# Patient Record
Sex: FEMALE | Race: WHITE | NOT HISPANIC OR LATINO | URBAN - METROPOLITAN AREA
[De-identification: names, ages, dates, MRNs, and addresses within clinical notes are randomized per-mention and may not be internally consistent; named-entity substitution may affect disease eponyms.]

---

## 2017-02-16 ENCOUNTER — OFFICE (OUTPATIENT)
Dept: URBAN - METROPOLITAN AREA CLINIC 14 | Facility: CLINIC | Age: 37
End: 2017-02-16

## 2017-02-16 VITALS
WEIGHT: 159 LBS | SYSTOLIC BLOOD PRESSURE: 120 MMHG | HEART RATE: 104 BPM | HEIGHT: 66 IN | DIASTOLIC BLOOD PRESSURE: 90 MMHG

## 2017-02-16 DIAGNOSIS — R10.13 EPIGASTRIC PAIN: ICD-10-CM

## 2017-02-16 DIAGNOSIS — N39.0 URINARY TRACT INFECTION, SITE NOT SPECIFIED: ICD-10-CM

## 2017-02-16 DIAGNOSIS — K75.81 NONALCOHOLIC STEATOHEPATITIS (NASH): ICD-10-CM

## 2017-02-16 DIAGNOSIS — K92.0 HEMATEMESIS: ICD-10-CM

## 2017-02-16 DIAGNOSIS — E78.5 HYPERLIPIDEMIA, UNSPECIFIED: ICD-10-CM

## 2017-02-16 PROCEDURE — 99214 OFFICE O/P EST MOD 30 MIN: CPT | Performed by: INTERNAL MEDICINE

## 2017-02-16 RX ORDER — DEXLANSOPRAZOLE 60 MG/1
60 CAPSULE, DELAYED RELEASE ORAL
Qty: 30 | Refills: 0 | Status: COMPLETED
Start: 2017-02-16 | End: 2017-04-10

## 2017-02-16 RX ORDER — CIPROFLOXACIN 500 MG/1
TABLET, FILM COATED ORAL
Qty: 14 | Refills: 0 | Status: COMPLETED
Start: 2017-02-16 | End: 2017-04-10

## 2017-02-17 ENCOUNTER — ON CAMPUS - OUTPATIENT (OUTPATIENT)
Dept: URBAN - METROPOLITAN AREA HOSPITAL 117 | Facility: HOSPITAL | Age: 37
End: 2017-02-17

## 2017-02-17 DIAGNOSIS — K29.20 ALCOHOLIC GASTRITIS WITHOUT BLEEDING: ICD-10-CM

## 2017-02-17 DIAGNOSIS — K21.0 GASTRO-ESOPHAGEAL REFLUX DISEASE WITH ESOPHAGITIS: ICD-10-CM

## 2017-02-17 DIAGNOSIS — K22.5 DIVERTICULUM OF ESOPHAGUS, ACQUIRED: ICD-10-CM

## 2017-02-17 DIAGNOSIS — K92.0 HEMATEMESIS: ICD-10-CM

## 2017-02-17 DIAGNOSIS — R10.84 GENERALIZED ABDOMINAL PAIN: ICD-10-CM

## 2017-02-17 DIAGNOSIS — K75.81 NONALCOHOLIC STEATOHEPATITIS (NASH): ICD-10-CM

## 2017-02-17 DIAGNOSIS — K44.9 DIAPHRAGMATIC HERNIA WITHOUT OBSTRUCTION OR GANGRENE: ICD-10-CM

## 2017-02-17 DIAGNOSIS — I85.00 ESOPHAGEAL VARICES WITHOUT BLEEDING: ICD-10-CM

## 2017-02-17 PROCEDURE — 43239 EGD BIOPSY SINGLE/MULTIPLE: CPT | Performed by: INTERNAL MEDICINE

## 2017-02-17 PROCEDURE — 99219: CPT | Mod: 25 | Performed by: INTERNAL MEDICINE

## 2017-04-10 ENCOUNTER — OFFICE (OUTPATIENT)
Dept: URBAN - METROPOLITAN AREA CLINIC 14 | Facility: CLINIC | Age: 37
End: 2017-04-10

## 2017-04-10 VITALS
HEART RATE: 85 BPM | WEIGHT: 151 LBS | SYSTOLIC BLOOD PRESSURE: 108 MMHG | HEIGHT: 66 IN | DIASTOLIC BLOOD PRESSURE: 78 MMHG

## 2017-04-10 DIAGNOSIS — E78.5 HYPERLIPIDEMIA, UNSPECIFIED: ICD-10-CM

## 2017-04-10 DIAGNOSIS — K92.0 HEMATEMESIS: ICD-10-CM

## 2017-04-10 DIAGNOSIS — K75.81 NONALCOHOLIC STEATOHEPATITIS (NASH): ICD-10-CM

## 2017-04-10 PROCEDURE — 99214 OFFICE O/P EST MOD 30 MIN: CPT | Performed by: NURSE PRACTITIONER

## 2017-04-10 NOTE — SERVICEHPINOTES
Ms. Vann presents for a hospital follow-up.  She was hospitalized at [facility] 2/17/17 and had an EGD done by Dr. Morales that revealed gastritis, hiatal hernia and very small esophageal varices.  She denies any further hematemesis and her reflux is controlled with pantoprazole.  She has been seeing Dr. Cordova regarding her dyslipidemia and notes this has significantly improved with a recent total cholesterol of 166, and LDL of 101 and an HDL of 39.  Her recent ALT was 126 with an AST of 190.  She did have a recent  liver biopsy that showed her fibrosis level was a stage 2 and had CHANDA score was 7 out of 8. This was pretty similar to any liver biopsy done a year ago.   She is considering a new clinical trial for RUSSO. She was on [employer] clinical trial which was held due to lack of efficacy and possible worsening of dyslipidemia.  She states that she has not had any alcohol in 7 days.

## 2017-10-31 ENCOUNTER — OFFICE (OUTPATIENT)
Dept: URBAN - METROPOLITAN AREA CLINIC 11 | Facility: CLINIC | Age: 37
End: 2017-10-31

## 2017-10-31 VITALS
SYSTOLIC BLOOD PRESSURE: 128 MMHG | DIASTOLIC BLOOD PRESSURE: 94 MMHG | HEART RATE: 86 BPM | HEIGHT: 66 IN | WEIGHT: 144 LBS

## 2017-10-31 DIAGNOSIS — R10.13 EPIGASTRIC PAIN: ICD-10-CM

## 2017-10-31 DIAGNOSIS — K75.81 NONALCOHOLIC STEATOHEPATITIS (NASH): ICD-10-CM

## 2017-10-31 LAB — ETHANOL, BLOOD: ETHANOL: 0.05 % (ref 0.01–?)

## 2017-10-31 PROCEDURE — 99213 OFFICE O/P EST LOW 20 MIN: CPT | Performed by: INTERNAL MEDICINE

## 2020-08-05 ENCOUNTER — INPATIENT HOSPITAL (OUTPATIENT)
Dept: URBAN - METROPOLITAN AREA HOSPITAL 116 | Facility: HOSPITAL | Age: 40
End: 2020-08-05

## 2020-08-05 DIAGNOSIS — K76.0 FATTY (CHANGE OF) LIVER, NOT ELSEWHERE CLASSIFIED: ICD-10-CM

## 2020-08-05 DIAGNOSIS — U07.1 COVID-19: ICD-10-CM

## 2020-08-05 DIAGNOSIS — N39.0 URINARY TRACT INFECTION, SITE NOT SPECIFIED: ICD-10-CM

## 2020-08-05 DIAGNOSIS — K29.70 GASTRITIS, UNSPECIFIED, WITHOUT BLEEDING: ICD-10-CM

## 2020-08-05 PROCEDURE — 99222 1ST HOSP IP/OBS MODERATE 55: CPT | Performed by: INTERNAL MEDICINE

## 2020-08-06 PROCEDURE — 99232 SBSQ HOSP IP/OBS MODERATE 35: CPT | Performed by: INTERNAL MEDICINE

## 2020-08-07 ENCOUNTER — INPATIENT HOSPITAL (OUTPATIENT)
Dept: URBAN - METROPOLITAN AREA HOSPITAL 116 | Facility: HOSPITAL | Age: 40
End: 2020-08-07

## 2020-08-07 DIAGNOSIS — K29.70 GASTRITIS, UNSPECIFIED, WITHOUT BLEEDING: ICD-10-CM

## 2020-08-07 DIAGNOSIS — K76.0 FATTY (CHANGE OF) LIVER, NOT ELSEWHERE CLASSIFIED: ICD-10-CM

## 2020-08-07 DIAGNOSIS — U07.1 COVID-19: ICD-10-CM

## 2020-08-07 DIAGNOSIS — N39.0 URINARY TRACT INFECTION, SITE NOT SPECIFIED: ICD-10-CM

## 2020-08-07 PROCEDURE — 99232 SBSQ HOSP IP/OBS MODERATE 35: CPT | Performed by: INTERNAL MEDICINE

## 2024-08-07 ENCOUNTER — OFFICE (OUTPATIENT)
Dept: URBAN - METROPOLITAN AREA CLINIC 19 | Facility: CLINIC | Age: 44
End: 2024-08-07
Payer: COMMERCIAL

## 2024-08-07 VITALS
HEIGHT: 66 IN | SYSTOLIC BLOOD PRESSURE: 140 MMHG | WEIGHT: 147 LBS | OXYGEN SATURATION: 100 % | DIASTOLIC BLOOD PRESSURE: 101 MMHG | HEART RATE: 108 BPM

## 2024-08-07 DIAGNOSIS — E55.9 VITAMIN D DEFICIENCY, UNSPECIFIED: ICD-10-CM

## 2024-08-07 DIAGNOSIS — D50.9 IRON DEFICIENCY ANEMIA, UNSPECIFIED: ICD-10-CM

## 2024-08-07 DIAGNOSIS — R68.81 EARLY SATIETY: ICD-10-CM

## 2024-08-07 DIAGNOSIS — R74.01 ELEVATION OF LEVELS OF LIVER TRANSAMINASE LEVELS: ICD-10-CM

## 2024-08-07 DIAGNOSIS — R16.0 HEPATOMEGALY, NOT ELSEWHERE CLASSIFIED: ICD-10-CM

## 2024-08-07 DIAGNOSIS — R74.8 ABNORMAL LEVELS OF OTHER SERUM ENZYMES: ICD-10-CM

## 2024-08-07 PROCEDURE — 99204 OFFICE O/P NEW MOD 45 MIN: CPT | Performed by: INTERNAL MEDICINE

## 2024-08-07 RX ORDER — VITAMIN A 10000 UNIT
TABLET ORAL
Qty: 30 | Refills: 11 | Status: ACTIVE
Start: 2024-08-07

## 2024-08-07 RX ORDER — CHOLECALCIFEROL CAP 1.25 MG (50000 UNIT) 1.25 MG
CAP ORAL
Qty: 4 | Refills: 1 | Status: ACTIVE
Start: 2024-08-07

## 2024-08-07 RX ORDER — SODIUM SULFATE, POTASSIUM SULFATE, MAGNESIUM SULFATE 17.5; 3.13; 1.6 G/ML; G/ML; G/ML
SOLUTION, CONCENTRATE ORAL
Qty: 1 | Refills: 0 | Status: ACTIVE
Start: 2024-08-07

## 2024-08-07 NOTE — SERVICEHPINOTES
43-year-old female referred for evaluation of abnormal liver enzymes and iron deficiency anemia.  Complaining of fatigue as well as back pain.  Takes ibuprofen on an as-needed basis.  Denies any hematemesis or melena or hematochezia.  Has an IUD in place and denies any significant vaginal bleeding.  Does not donate blood.  Reports history of alcohol abuse in the past.  She was sober for 6 years and then started drinking again in April 2024.  Reports that she consumed alcohol for 3-4 weeks and then quit drinking.  Complaining of early satiety.  Reports losing 15 pounds since May 2024.  No family history of liver disease.  Paternal grandfather with colon polyps.  She has never had a colonoscopy.  Labs done outside in July 2024 revealed , ALT 42, bilirubin 0.7.  Sodium was 142, creatinine 0.48, hematocrit 31.7, MCV 83, platelet count 317.  Iron saturation was 6 and ferritin of 19.  TSH was normal.  Retic count was 1.6.  Vitamin-D level was 27.9.  Ammonia level was 287. She takes lorazepam on an as-needed basis. B12 was 539 and folate was less than 2.  INR was 1.2.  Prior labs in June 2024 revealed AST of 305, ALT 75 and alkaline phosphatase 248.  Ultrasound in July 2024 revealed hepatomegaly with the liver span measuring 24 centimeters.  Portal vein was patent.  She did not have any gallstones.  Nodularity of the liver noted which was concerning for cirrhosis.  Patient reports that she had a stress test done recently which was negative.  Reports that she was started on Repatha.  Not on iron replacement therapy.  Taking folic acid daily.  Has not started vitamin D replacement therapy.  She had an EGD in 2017 which revealed 5 centimeter hiatal hernia, grade C esophagitis and an esophageal diverticulum.  She also had esophageal varices.  Erythema noted in the stomach but biopsies were negative for H pylori.  Liver biopsy in 2017 was concerning for steatohepatitis grade 3/4 and stage II fibrosis.

## 2024-09-27 PROBLEM — D53.9 UNEXPLAINED IRON DEFICIENCY ANEMIA: Status: ACTIVE | Noted: 2024-09-27

## 2024-09-27 PROBLEM — K44.9 DIAPHRAGMATIC HERNIA WITHOUT OBSTRUCTION OR GANGRENE: Status: ACTIVE | Noted: 2024-09-27

## 2024-09-27 PROBLEM — K57.30 DIVERTICULOSIS OF LARGE INTESTINE WITHOUT PERFORATION OR ABS: Status: ACTIVE | Noted: 2024-09-27

## 2024-09-27 PROBLEM — K31.89 OTHER DISEASES OF STOMACH AND DUODENUM: Status: ACTIVE | Noted: 2024-09-27

## 2024-10-04 PROBLEM — T45.4X5A INTOLERANCE TO ORAL IRON: Status: ACTIVE | Noted: 2024-10-04

## 2024-12-11 ENCOUNTER — AMBULATORY SURGICAL CENTER (OUTPATIENT)
Dept: URBAN - METROPOLITAN AREA SURGERY 2 | Facility: SURGERY | Age: 44
End: 2024-12-11
Payer: COMMERCIAL

## 2024-12-11 ENCOUNTER — OFFICE (OUTPATIENT)
Dept: URBAN - METROPOLITAN AREA PATHOLOGY 12 | Facility: PATHOLOGY | Age: 44
End: 2024-12-11
Payer: COMMERCIAL

## 2024-12-11 VITALS
HEART RATE: 91 BPM | TEMPERATURE: 98 F | HEART RATE: 92 BPM | HEART RATE: 88 BPM | OXYGEN SATURATION: 98 % | HEART RATE: 95 BPM | DIASTOLIC BLOOD PRESSURE: 68 MMHG | SYSTOLIC BLOOD PRESSURE: 105 MMHG | SYSTOLIC BLOOD PRESSURE: 102 MMHG | SYSTOLIC BLOOD PRESSURE: 98 MMHG | OXYGEN SATURATION: 99 % | DIASTOLIC BLOOD PRESSURE: 51 MMHG | RESPIRATION RATE: 16 BRPM | DIASTOLIC BLOOD PRESSURE: 63 MMHG | HEIGHT: 66 IN | WEIGHT: 140 LBS | RESPIRATION RATE: 18 BRPM | HEART RATE: 94 BPM | SYSTOLIC BLOOD PRESSURE: 92 MMHG | DIASTOLIC BLOOD PRESSURE: 67 MMHG | TEMPERATURE: 98.1 F

## 2024-12-11 DIAGNOSIS — K62.89 OTHER SPECIFIED DISEASES OF ANUS AND RECTUM: ICD-10-CM

## 2024-12-11 DIAGNOSIS — K92.2 GASTROINTESTINAL HEMORRHAGE, UNSPECIFIED: ICD-10-CM

## 2024-12-11 DIAGNOSIS — K22.2 ESOPHAGEAL OBSTRUCTION: ICD-10-CM

## 2024-12-11 DIAGNOSIS — D50.9 IRON DEFICIENCY ANEMIA, UNSPECIFIED: ICD-10-CM

## 2024-12-11 DIAGNOSIS — K44.9 DIAPHRAGMATIC HERNIA WITHOUT OBSTRUCTION OR GANGRENE: ICD-10-CM

## 2024-12-11 DIAGNOSIS — R13.10 DYSPHAGIA, UNSPECIFIED: ICD-10-CM

## 2024-12-11 DIAGNOSIS — K20.91 ESOPHAGITIS, UNSPECIFIED WITH BLEEDING: ICD-10-CM

## 2024-12-11 DIAGNOSIS — K31.89 OTHER DISEASES OF STOMACH AND DUODENUM: ICD-10-CM

## 2024-12-11 DIAGNOSIS — K22.10 ULCER OF ESOPHAGUS WITHOUT BLEEDING: ICD-10-CM

## 2024-12-11 PROBLEM — K63.89 OTHER SPECIFIED DISEASES OF INTESTINE: Status: ACTIVE | Noted: 2024-12-11

## 2024-12-11 PROBLEM — K20.81 OTHER ESOPHAGITIS WITH BLEEDING: Status: ACTIVE | Noted: 2024-12-11

## 2024-12-11 PROCEDURE — 88341 IMHCHEM/IMCYTCHM EA ADD ANTB: CPT | Performed by: PATHOLOGY

## 2024-12-11 PROCEDURE — 88312 SPECIAL STAINS GROUP 1: CPT | Performed by: PATHOLOGY

## 2024-12-11 PROCEDURE — 88313 SPECIAL STAINS GROUP 2: CPT | Performed by: PATHOLOGY

## 2024-12-11 PROCEDURE — 88342 IMHCHEM/IMCYTCHM 1ST ANTB: CPT | Performed by: PATHOLOGY

## 2024-12-11 PROCEDURE — 43239 EGD BIOPSY SINGLE/MULTIPLE: CPT | Mod: 59 | Performed by: INTERNAL MEDICINE

## 2024-12-11 PROCEDURE — 43248 EGD GUIDE WIRE INSERTION: CPT | Mod: 51 | Performed by: INTERNAL MEDICINE

## 2024-12-11 PROCEDURE — 45380 COLONOSCOPY AND BIOPSY: CPT | Performed by: INTERNAL MEDICINE

## 2024-12-11 PROCEDURE — 88305 TISSUE EXAM BY PATHOLOGIST: CPT | Performed by: PATHOLOGY

## 2024-12-11 RX ADMIN — PROPOFOL 80 MG: 10 INJECTION, EMULSION INTRAVENOUS at 10:12

## 2024-12-16 ENCOUNTER — OFFICE (OUTPATIENT)
Dept: URBAN - METROPOLITAN AREA CLINIC 19 | Facility: CLINIC | Age: 44
End: 2024-12-16
Payer: COMMERCIAL

## 2024-12-16 VITALS
DIASTOLIC BLOOD PRESSURE: 70 MMHG | OXYGEN SATURATION: 100 % | SYSTOLIC BLOOD PRESSURE: 104 MMHG | WEIGHT: 153 LBS | HEART RATE: 98 BPM | HEIGHT: 66 IN

## 2024-12-16 DIAGNOSIS — R14.0 ABDOMINAL DISTENSION (GASEOUS): ICD-10-CM

## 2024-12-16 DIAGNOSIS — R10.84 GENERALIZED ABDOMINAL PAIN: ICD-10-CM

## 2024-12-16 DIAGNOSIS — F10.10 ALCOHOL ABUSE, UNCOMPLICATED: ICD-10-CM

## 2024-12-16 DIAGNOSIS — K59.00 CONSTIPATION, UNSPECIFIED: ICD-10-CM

## 2024-12-16 PROCEDURE — 99214 OFFICE O/P EST MOD 30 MIN: CPT | Performed by: INTERNAL MEDICINE

## 2024-12-16 RX ORDER — LEVOFLOXACIN 500 MG/1
TABLET, FILM COATED ORAL
Qty: 7 | Refills: 0 | Status: ACTIVE
Start: 2024-12-16

## 2024-12-16 NOTE — SERVICEHPINOTES
43-year-old female here for a follow-up visit. EGD and colonoscopy were completed on December 11th. EGD performed for dysphagia revealed normal duodenum, grade D esophagitis, stricture in the lower 3rd of the esophagus, hiatal hernia, erythema in the antrum. Pathology revealed reactive gastropathy and negative H pylori in the stomach, mild-to-moderate atrophy of the oxyntic glands, ulcerative esophagitis with no De La Torre's mucosa. Colonoscopy performed for unexplained iron-deficiency anemia with visualization of the cecum revealed normal mucosa in the terminal ileum, congestion and friability in the distal rectum, diverticulosis of the ascending colon and sigmoid colon. Pathology revealed acute colitis with pseudomembranes in the distal rectum. Following the procedure on the 11th, patient reported abdominal pain and cramping on December 12th.  Recommendations for low dose Tylenol and increasing fluid intake were provided. Follow-up with patient on December 13th revealed abdominal pain significantly improved as well as increased appetite. Patient was advised to take PPI as well as Carafate twice a day. Update provided today from patient included severe bloating with excessive pain. Reported that she was passing small amount of stool. Denied any rectal bleeding. Reports increased abdominal girth as well as excessive bloating. Reports abdominal pain that has been persistent and is generalized. Reports heat alleviates some of the pain. Denies any alcohol use. Denies any hematochezia, melena, hematemesis. Labs performed in December 2024 revealed iron binding capacity less than 175, iron 158, iron saturation greater than 90, ferritin 463, glucose 103, BUN 2, creatinine 0.46, potassium 3.3, chloride 94, albumin 3.4, bilirubin 4.4, , , ALT 30, MCV 99. PETH level was 185.br
br Evaluated in August 2024 for abnormal liver enzymes and iron deficiency anemia.  Tissue transglutaminase IgG was minimally elevated.  Ferritin was 14 and iron saturation was 5.  Bilirubin was 0.7, AST 54, alkaline phosphatase 137.  GUILLE, SMA and GUILLE were not elevated.  Alpha-fetoprotein was 3.4.  She was not immune to hepatitis A or B.  Ammonia level was 206.  Phosphatidyl ethanol level was 1420.  Lipid panel revealed total cholesterol 172, , triglycerides 139 and HDL 24.  EGD and colonoscopy were performed in September 2024 and revealed grade D ulcerative esophagitis extending from 25 cm to the Z-line, 4 cm hiatal hernia and erythema in the antrum.  Biopsies were negative for H pylori/ celiac sprue / eosinophilic esophagitis.  Colonoscopy was suboptimal prep and revealed normal terminal ileum and small internal hemorrhoids and diverticulosis in the ascending and sigmoid colon.  She received Monoferric iron infusion on 10/04/2024. Initially referred for evaluation of abnormal liver enzymes and iron deficiency anemia. Has an IUD in place and denies any significant vaginal bleeding.  Does not donate blood.  Reports history of alcohol abuse in the past.  She was sober for 6 years and then started drinking again in April 2024.  Reports that she consumed alcohol for 3-4 weeks and then quit drinking.  No family history of liver disease.  Paternal grandfather with colon polyps.Labs done outside in July 2024 revealed , ALT 42, bilirubin 0.7.  Sodium was 142, creatinine 0.48, hematocrit 31.7, MCV 83, platelet count 317.  Iron saturation was 6 and ferritin of 19.  TSH was normal.  Retic count was 1.6.  Vitamin-D level was 27.9.  Ammonia level was 287. She takes lorazepam on an as-needed basis. B12 was 539 and folate was less than 2.  INR was 1.2.  Prior labs in June 2024 revealed AST of 305, ALT 75 and alkaline phosphatase 248.  Ultrasound in July 2024 revealed hepatomegaly with the liver span measuring 24 centimeters.  Portal vein was patent.  She did not have any gallstones.  Nodularity of the liver noted which was concerning for cirrhosis.  Patient reports that she had a stress test done recently which was negative.  Reports that she was started on Repatha. She had an EGD in 2017 which revealed 5 centimeter hiatal hernia, grade C esophagitis and an esophageal diverticulum.  She also had esophageal varices.  Erythema noted in the stomach but biopsies were negative for H pylori.  Liver biopsy in 2017 was concerning for steatohepatitis grade 3/4 and stage II fibrosis.

## 2024-12-16 NOTE — SERVICENOTES
MD Addendum: I, Lashon Goetz MD, independently interviewed and examined this patient and made modifications to the final HPI, exam, impression, and plan as initially scribed by Sugar Hernandez DNP.

## 2024-12-17 LAB
CBC, PLATELET, NO DIFFERENTIAL: HEMATOCRIT: 32.4 % — LOW (ref 34–46.6)
CBC, PLATELET, NO DIFFERENTIAL: HEMOGLOBIN: 10.6 G/DL — LOW (ref 11.1–15.9)
CBC, PLATELET, NO DIFFERENTIAL: MCH: 33.4 PG — HIGH (ref 26.6–33)
CBC, PLATELET, NO DIFFERENTIAL: MCHC: 32.7 G/DL (ref 31.5–35.7)
CBC, PLATELET, NO DIFFERENTIAL: MCV: 102 FL — HIGH (ref 79–97)
CBC, PLATELET, NO DIFFERENTIAL: PLATELETS: 413 X10E3/UL (ref 150–450)
CBC, PLATELET, NO DIFFERENTIAL: RBC: 3.17 X10E6/UL — LOW (ref 3.77–5.28)
CBC, PLATELET, NO DIFFERENTIAL: RDW: 15.5 % — HIGH (ref 11.7–15.4)
CBC, PLATELET, NO DIFFERENTIAL: WBC: 9.8 X10E3/UL (ref 3.4–10.8)
COMP. METABOLIC PANEL (14): ALBUMIN: 3.1 G/DL — LOW (ref 3.9–4.9)
COMP. METABOLIC PANEL (14): ALKALINE PHOSPHATASE: 119 IU/L (ref 44–121)
COMP. METABOLIC PANEL (14): ALT (SGPT): 36 IU/L — HIGH (ref 0–32)
COMP. METABOLIC PANEL (14): AST (SGOT): 142 IU/L — HIGH (ref 0–40)
COMP. METABOLIC PANEL (14): BILIRUBIN, TOTAL: 4.1 MG/DL — HIGH (ref 0–1.2)
COMP. METABOLIC PANEL (14): BUN/CREATININE RATIO: 11 (ref 9–23)
COMP. METABOLIC PANEL (14): BUN: 4 MG/DL — LOW (ref 6–24)
COMP. METABOLIC PANEL (14): CALCIUM: 8.5 MG/DL — LOW (ref 8.7–10.2)
COMP. METABOLIC PANEL (14): CARBON DIOXIDE, TOTAL: 27 MMOL/L (ref 20–29)
COMP. METABOLIC PANEL (14): CHLORIDE: 93 MMOL/L — LOW (ref 96–106)
COMP. METABOLIC PANEL (14): CREATININE: 0.35 MG/DL — LOW (ref 0.57–1)
COMP. METABOLIC PANEL (14): EGFR: 129 ML/MIN/1.73 (ref 59–?)
COMP. METABOLIC PANEL (14): GLOBULIN, TOTAL: 2.8 G/DL (ref 1.5–4.5)
COMP. METABOLIC PANEL (14): GLUCOSE: 91 MG/DL (ref 70–99)
COMP. METABOLIC PANEL (14): POTASSIUM: 2.8 MMOL/L — LOW (ref 3.5–5.2)
COMP. METABOLIC PANEL (14): PROTEIN, TOTAL: 5.9 G/DL — LOW (ref 6–8.5)
COMP. METABOLIC PANEL (14): SODIUM: 134 MMOL/L (ref 134–144)
PROTHROMBIN TIME (PT): INR: 1.3 — HIGH (ref 0.9–1.2)
PROTHROMBIN TIME (PT): PROTHROMBIN TIME: 14.2 SEC — HIGH (ref 9.1–12)

## 2024-12-23 LAB — GASTRO ONE PATHOLOGY: PDF REPORT: (no result)

## 2024-12-26 ENCOUNTER — OFFICE (OUTPATIENT)
Dept: URBAN - METROPOLITAN AREA CLINIC 19 | Facility: CLINIC | Age: 44
End: 2024-12-26
Payer: COMMERCIAL

## 2024-12-26 VITALS
HEIGHT: 66 IN | SYSTOLIC BLOOD PRESSURE: 102 MMHG | HEART RATE: 100 BPM | DIASTOLIC BLOOD PRESSURE: 67 MMHG | WEIGHT: 145 LBS | OXYGEN SATURATION: 100 %

## 2024-12-26 DIAGNOSIS — K59.00 CONSTIPATION, UNSPECIFIED: ICD-10-CM

## 2024-12-26 DIAGNOSIS — R10.84 GENERALIZED ABDOMINAL PAIN: ICD-10-CM

## 2024-12-26 DIAGNOSIS — F10.10 ALCOHOL ABUSE, UNCOMPLICATED: ICD-10-CM

## 2024-12-26 PROCEDURE — 99214 OFFICE O/P EST MOD 30 MIN: CPT | Performed by: NURSE PRACTITIONER

## 2024-12-27 LAB
COMP. METABOLIC PANEL (14): ALBUMIN: 3.1 G/DL — LOW (ref 3.9–4.9)
COMP. METABOLIC PANEL (14): ALKALINE PHOSPHATASE: 122 IU/L — HIGH (ref 44–121)
COMP. METABOLIC PANEL (14): ALT (SGPT): 32 IU/L (ref 0–32)
COMP. METABOLIC PANEL (14): AST (SGOT): 168 IU/L — HIGH (ref 0–40)
COMP. METABOLIC PANEL (14): BILIRUBIN, TOTAL: 2.7 MG/DL — HIGH (ref 0–1.2)
COMP. METABOLIC PANEL (14): BUN/CREATININE RATIO: 6 — LOW (ref 9–23)
COMP. METABOLIC PANEL (14): BUN: 3 MG/DL — LOW (ref 6–24)
COMP. METABOLIC PANEL (14): CALCIUM: 9.1 MG/DL (ref 8.7–10.2)
COMP. METABOLIC PANEL (14): CARBON DIOXIDE, TOTAL: 27 MMOL/L (ref 20–29)
COMP. METABOLIC PANEL (14): CHLORIDE: 101 MMOL/L (ref 96–106)
COMP. METABOLIC PANEL (14): CREATININE: 0.5 MG/DL — LOW (ref 0.57–1)
COMP. METABOLIC PANEL (14): EGFR: 119 ML/MIN/1.73 (ref 59–?)
COMP. METABOLIC PANEL (14): GLOBULIN, TOTAL: 3.1 G/DL (ref 1.5–4.5)
COMP. METABOLIC PANEL (14): GLUCOSE: 80 MG/DL (ref 70–99)
COMP. METABOLIC PANEL (14): POTASSIUM: 4.1 MMOL/L (ref 3.5–5.2)
COMP. METABOLIC PANEL (14): PROTEIN, TOTAL: 6.2 G/DL (ref 6–8.5)
COMP. METABOLIC PANEL (14): SODIUM: 138 MMOL/L (ref 134–144)
MAGNESIUM: 2.1 MG/DL (ref 1.6–2.3)

## 2025-01-08 ENCOUNTER — OFFICE (OUTPATIENT)
Dept: URBAN - METROPOLITAN AREA CLINIC 19 | Facility: CLINIC | Age: 45
End: 2025-01-08
Payer: COMMERCIAL

## 2025-01-08 VITALS
HEART RATE: 85 BPM | SYSTOLIC BLOOD PRESSURE: 117 MMHG | HEIGHT: 66 IN | OXYGEN SATURATION: 99 % | WEIGHT: 138 LBS | DIASTOLIC BLOOD PRESSURE: 73 MMHG

## 2025-01-08 DIAGNOSIS — K70.10 ALCOHOLIC HEPATITIS WITHOUT ASCITES: ICD-10-CM

## 2025-01-08 DIAGNOSIS — Z23 ENCOUNTER FOR IMMUNIZATION: ICD-10-CM

## 2025-01-08 DIAGNOSIS — K22.10 ULCER OF ESOPHAGUS WITHOUT BLEEDING: ICD-10-CM

## 2025-01-08 DIAGNOSIS — K59.00 CONSTIPATION, UNSPECIFIED: ICD-10-CM

## 2025-01-08 DIAGNOSIS — R74.8 ABNORMAL LEVELS OF OTHER SERUM ENZYMES: ICD-10-CM

## 2025-01-08 DIAGNOSIS — R74.01 ELEVATION OF LEVELS OF LIVER TRANSAMINASE LEVELS: ICD-10-CM

## 2025-01-08 PROCEDURE — 90636 HEP A/HEP B VACC ADULT IM: CPT | Performed by: INTERNAL MEDICINE

## 2025-01-08 PROCEDURE — 90471 IMMUNIZATION ADMIN: CPT | Performed by: INTERNAL MEDICINE

## 2025-01-08 PROCEDURE — 99214 OFFICE O/P EST MOD 30 MIN: CPT | Mod: 25 | Performed by: INTERNAL MEDICINE

## 2025-01-09 LAB
COMP. METABOLIC PANEL (14): ALBUMIN: 3.5 G/DL — LOW (ref 3.9–4.9)
COMP. METABOLIC PANEL (14): ALKALINE PHOSPHATASE: 113 IU/L (ref 44–121)
COMP. METABOLIC PANEL (14): ALT (SGPT): 30 IU/L (ref 0–32)
COMP. METABOLIC PANEL (14): AST (SGOT): 132 IU/L — HIGH (ref 0–40)
COMP. METABOLIC PANEL (14): BILIRUBIN, TOTAL: 1.5 MG/DL — HIGH (ref 0–1.2)
COMP. METABOLIC PANEL (14): BUN/CREATININE RATIO: 9 (ref 9–23)
COMP. METABOLIC PANEL (14): BUN: 4 MG/DL — LOW (ref 6–24)
COMP. METABOLIC PANEL (14): CALCIUM: 9.2 MG/DL (ref 8.7–10.2)
COMP. METABOLIC PANEL (14): CARBON DIOXIDE, TOTAL: 28 MMOL/L (ref 20–29)
COMP. METABOLIC PANEL (14): CHLORIDE: 98 MMOL/L (ref 96–106)
COMP. METABOLIC PANEL (14): CREATININE: 0.46 MG/DL — LOW (ref 0.57–1)
COMP. METABOLIC PANEL (14): EGFR: 120 ML/MIN/1.73 (ref 59–?)
COMP. METABOLIC PANEL (14): GLOBULIN, TOTAL: 3.2 G/DL (ref 1.5–4.5)
COMP. METABOLIC PANEL (14): GLUCOSE: 84 MG/DL (ref 70–99)
COMP. METABOLIC PANEL (14): POTASSIUM: 4.1 MMOL/L (ref 3.5–5.2)
COMP. METABOLIC PANEL (14): PROTEIN, TOTAL: 6.7 G/DL (ref 6–8.5)
COMP. METABOLIC PANEL (14): SODIUM: 138 MMOL/L (ref 134–144)